# Patient Record
Sex: MALE | Race: OTHER | Employment: OTHER | URBAN - METROPOLITAN AREA
[De-identification: names, ages, dates, MRNs, and addresses within clinical notes are randomized per-mention and may not be internally consistent; named-entity substitution may affect disease eponyms.]

---

## 2018-08-01 ENCOUNTER — HOSPITAL ENCOUNTER (EMERGENCY)
Facility: HOSPITAL | Age: 68
Discharge: HOME | End: 2018-08-01
Attending: EMERGENCY MEDICINE

## 2018-08-01 VITALS
RESPIRATION RATE: 18 BRPM | SYSTOLIC BLOOD PRESSURE: 136 MMHG | WEIGHT: 190 LBS | DIASTOLIC BLOOD PRESSURE: 78 MMHG | OXYGEN SATURATION: 98 % | TEMPERATURE: 99 F | HEART RATE: 71 BPM

## 2018-08-01 DIAGNOSIS — M70.21 OLECRANON BURSITIS OF RIGHT ELBOW: ICD-10-CM

## 2018-08-01 DIAGNOSIS — L03.113 RIGHT ARM CELLULITIS: Primary | ICD-10-CM

## 2018-08-01 PROCEDURE — 99282 EMERGENCY DEPT VISIT SF MDM: CPT

## 2018-08-01 NOTE — ED INITIAL ASSESSMENT (HPI)
Patient has cellulitis to right arm. Receiving treatment outpatient, and cellulitis is improving but son is seeing it for the first time and wants a trusted 2nd opinion.  Per wife patient received antibiotics, IM x 2 and is taking po clinda but no paperwork

## 2025-01-30 NOTE — ED PROVIDER NOTES
Patient Seen in: [facility] Emergency Department    History   Patient presents with:  Cellulitis (integumentary, infectious)    Stated Complaint: swollen arm    HPI    Patient is a 64-year-old  male who presents with right arm cellulitis. normal gait, 5/5 motor strength in all extremities, no focal deficits  SKIN: warm, dry. +swelling of bursa of right elbow. +erythema from above elbow down to wrist of right arm. No induration.         ED Course   Labs Reviewed - No data to display    [facility]

## (undated) NOTE — ED AVS SNAPSHOT
Jos Snyder   MRN: [id number]    Department:  [facility] Emergency Department   Date of Visit:  8/1/2018           Disclosure     Insurance plans vary and the physician(s) referred by the ER may not be covered by your plan.  Please contact yo within the next three months to obtain basic health screening including reassessment of your blood pressure.     IF THERE IS ANY CHANGE OR WORSENING OF YOUR CONDITION, CALL YOUR PRIMARY CARE PHYSICIAN AT ONCE OR RETURN IMMEDIATELY TO THE EMERGENCY DEPARTMEN